# Patient Record
Sex: FEMALE | Race: WHITE | NOT HISPANIC OR LATINO | Employment: FULL TIME | URBAN - METROPOLITAN AREA
[De-identification: names, ages, dates, MRNs, and addresses within clinical notes are randomized per-mention and may not be internally consistent; named-entity substitution may affect disease eponyms.]

---

## 2024-01-01 ENCOUNTER — APPOINTMENT (OUTPATIENT)
Dept: RADIOLOGY | Facility: HOSPITAL | Age: 44
End: 2024-01-01
Payer: COMMERCIAL

## 2024-01-01 ENCOUNTER — HOSPITAL ENCOUNTER (EMERGENCY)
Facility: HOSPITAL | Age: 44
Discharge: HOME | End: 2024-01-01
Attending: EMERGENCY MEDICINE
Payer: COMMERCIAL

## 2024-01-01 VITALS
OXYGEN SATURATION: 98 % | RESPIRATION RATE: 18 BRPM | HEART RATE: 69 BPM | SYSTOLIC BLOOD PRESSURE: 144 MMHG | TEMPERATURE: 98.5 F | DIASTOLIC BLOOD PRESSURE: 92 MMHG

## 2024-01-01 DIAGNOSIS — S93.402A SPRAIN OF LEFT ANKLE, UNSPECIFIED LIGAMENT, INITIAL ENCOUNTER: ICD-10-CM

## 2024-01-01 DIAGNOSIS — S93.602A FOOT SPRAIN, LEFT, INITIAL ENCOUNTER: Primary | ICD-10-CM

## 2024-01-01 PROCEDURE — 99283 EMERGENCY DEPT VISIT LOW MDM: CPT

## 2024-01-01 PROCEDURE — 73630 X-RAY EXAM OF FOOT: CPT

## 2024-01-01 PROCEDURE — 99284 EMERGENCY DEPT VISIT MOD MDM: CPT | Performed by: EMERGENCY MEDICINE

## 2024-01-01 PROCEDURE — 73610 X-RAY EXAM OF ANKLE: CPT

## 2024-01-01 RX ORDER — IBUPROFEN 400 MG/1
400 TABLET ORAL ONCE
Status: COMPLETED | OUTPATIENT
Start: 2024-01-01 | End: 2024-01-01

## 2024-01-01 RX ORDER — ACETAMINOPHEN 325 MG/1
650 TABLET ORAL ONCE
Status: COMPLETED | OUTPATIENT
Start: 2024-01-01 | End: 2024-01-01

## 2024-01-01 RX ADMIN — ACETAMINOPHEN 650 MG: 325 TABLET, FILM COATED ORAL at 20:17

## 2024-01-01 RX ADMIN — IBUPROFEN 400 MG: 400 TABLET, FILM COATED ORAL at 20:17

## 2024-01-01 NOTE — Clinical Note
Michael Marshall was seen and treated in our emergency department on 1/1/2024.                Diagnosis: Acute sprain left ankle and left foot    Michael  may return to work on return date.    She may return on this date: 01/08/2024         If you have any questions or concerns, please don't hesitate to call.      Matthew Soni, DO    ______________________________           _______________          _______________  Hospital Representative                              Date                                Time

## 2024-01-02 NOTE — DISCHARGE INSTRUCTIONS
Ice and elevate foot as often as possible.  Take Tylenol and/or ibuprofen as needed for pain.    Apply Ace wrap for compression when you will be ambulating.  Use crutches to avoid weightbearing until pain subsides.    If pain is not much better in 5 to 7 days schedule orthopedic appointment.  There may be a hairline fracture not visible on the initial x-ray images.  Protect your foot and ankle in the meantime as explained above.

## 2024-01-02 NOTE — ED PROVIDER NOTES
History  Chief Complaint   Patient presents with    Ankle Pain     Patient presents to the ED with c/o left foot pain, states she fell down the last two steps and twisted ankle. Denies head strike      43-year-old female missed the last 2 steps coming down stairs at her [employer] earlier this morning carrying laundry.  She is not sure whether she inverted or everted the foot but complains of pain in the lateral aspect of her left foot and ankle.  She scraped her left hand as well.  No other injuries.  Has been ambulating with discomfort today.        None       History reviewed. No pertinent past medical history.    History reviewed. No pertinent surgical history.    History reviewed. No pertinent family history.  I have reviewed and agree with the history as documented.    E-Cigarette/Vaping     E-Cigarette/Vaping Substances     Social History     Tobacco Use    Smoking status: Never    Smokeless tobacco: Never   Substance Use Topics    Alcohol use: Not Currently    Drug use: Not Currently       Review of Systems   Constitutional:  Negative for fever.   Neurological:  Negative for weakness and numbness.       Physical Exam  Physical Exam  Vitals and nursing note reviewed.   Constitutional:       General: She is not in acute distress.     Appearance: She is well-developed and normal weight. She is not ill-appearing.   HENT:      Head: Normocephalic and atraumatic.      Right Ear: External ear normal.      Left Ear: External ear normal.   Eyes:      Conjunctiva/sclera: Conjunctivae normal.      Pupils: Pupils are equal, round, and reactive to light.   Cardiovascular:      Rate and Rhythm: Normal rate and regular rhythm.      Pulses: Normal pulses.      Heart sounds: Normal heart sounds. No murmur heard.  Pulmonary:      Effort: Pulmonary effort is normal.      Breath sounds: Normal breath sounds.   Chest:      Chest wall: No tenderness.   Abdominal:      General: Bowel sounds are normal.      Palpations: Abdomen  is soft.      Tenderness: There is no abdominal tenderness.   Musculoskeletal:         General: Swelling, tenderness and signs of injury present. No deformity. Normal range of motion.      Cervical back: Normal range of motion and neck supple.      Comments: Left hip, thigh, knee atraumatic. Full ROM. Left ankle and , edematous laterally. This is mild.  Ankle ligaments stable. Distal neurovascular intact.  Full tendon function. No skin wound of lower extremity.    Skin:     General: Skin is warm and dry.      Capillary Refill: Capillary refill takes less than 2 seconds.      Findings: No bruising or rash.   Neurological:      General: No focal deficit present.      Mental Status: She is alert and oriented to person, place, and time. Mental status is at baseline.      Cranial Nerves: No cranial nerve deficit.      Coordination: Coordination normal.      Deep Tendon Reflexes: Reflexes are normal and symmetric.   Psychiatric:         Mood and Affect: Mood normal.         Behavior: Behavior normal.         Vital Signs  ED Triage Vitals   Temperature Pulse Respirations Blood Pressure SpO2   01/01/24 1816 01/01/24 1814 01/01/24 1814 01/01/24 1814 01/01/24 1814   98.5 °F (36.9 °C) 69 18 144/92 98 %      Temp Source Heart Rate Source Patient Position - Orthostatic VS BP Location FiO2 (%)   01/01/24 1816 01/01/24 1814 01/01/24 1814 01/01/24 1814 --   Temporal Monitor Sitting Left arm       Pain Score       01/01/24 1814       9           Vitals:    01/01/24 1814   BP: 144/92   Pulse: 69   Patient Position - Orthostatic VS: Sitting         Visual Acuity      ED Medications  Medications   ibuprofen (MOTRIN) tablet 400 mg (400 mg Oral Given 1/1/24 2017)   acetaminophen (TYLENOL) tablet 650 mg (650 mg Oral Given 1/1/24 2017)       Diagnostic Studies  Results Reviewed       None                   XR foot 3+ views LEFT   ED Interpretation by Matthew Soni DO (01/01 2007)   No acute fracture or dislocation.  Possible  bone cyst distal fibula      Final Result by Pedro Casey MD (01/02 0952)      No acute osseous abnormality.            Workstation performed: [id number]         XR ankle 3+ views LEFT   ED Interpretation by Matthew Soni DO (01/01 2007)   No acute fracture or dislocation      Final Result by Pedro Casey MD (01/02 0952)      No acute osseous abnormality.            Workstation performed: [id number]                    Procedures  Procedures         ED Course                               SBIRT 20yo+      Flowsheet Row Most Recent Value   Initial Alcohol Screen: US AUDIT-C     1. How often do you have a drink containing alcohol? 0 Filed at: 01/01/2024 1816   2. How many drinks containing alcohol do you have on a typical day you are drinking?  0 Filed at: 01/01/2024 1816   3a. Male UNDER 65: How often do you have five or more drinks on one occasion? 0 Filed at: 01/01/2024 1816   3b. FEMALE Any Age, or MALE 65+: How often do you have 4 or more drinks on one occassion? 0 Filed at: 01/01/2024 1816   Audit-C Score 0 Filed at: 01/01/2024 1816   BIRD: How many times in the past year have you...    Used an illegal drug or used a prescription medication for non-medical reasons? Never Filed at: 01/01/2024 1816                      Medical Decision Making  Acute injuury to left ankle and foot.   Low energy impact.  Patient has been ambulating with antalgic gait since event.  Concern for foot and/or ankle sprain and/or fracture.  Will review imaging.    Amount and/or Complexity of Data Reviewed  Radiology: ordered and independent interpretation performed.    Risk  OTC drugs.  Prescription drug management.             Disposition  Final diagnoses:   Foot sprain, left, initial encounter   Sprain of left ankle, unspecified ligament, initial encounter     Time reflects when diagnosis was documented in both MDM as applicable and the Disposition within this note       Time User Action Codes Description Comment     1/1/2024  8:08 PM Matthew Soni Add [M25.571] Acute right ankle pain     1/1/2024  8:08 PM Matthew Soni Remove [M25.571] Acute right ankle pain     1/1/2024  8:09 PM Matthew Soni Add [S93.602A] Foot sprain, left, initial encounter     1/1/2024  8:09 PM Matthew Soni Add [S93.402A] Sprain of left ankle, unspecified ligament, initial encounter           ED Disposition       ED Disposition   Discharge    Condition   Stable    Date/Time   Mon Jan 1, 2024 2008    Comment   Michael Marshall discharge to home/self care.                   Follow-up Information       Follow up With Specialties Details Why Contact Info Additional Information    [facility] Schedule an appointment as soon as possible for a visit in 1 week As needed [address] [facility] [address]  [facility] [address]  [phone] [facility] [address] [place], [place], [address]  [phone]            There are no discharge medications for this patient.      No discharge procedures on file.    PDMP Review       None            ED Provider  Electronically Signed by             Matthew Soni DO  01/02/24 9481

## 2024-01-09 ENCOUNTER — OFFICE VISIT (OUTPATIENT)
Dept: OBGYN CLINIC | Facility: CLINIC | Age: 44
End: 2024-01-09
Payer: COMMERCIAL

## 2024-01-09 VITALS
DIASTOLIC BLOOD PRESSURE: 85 MMHG | BODY MASS INDEX: 22.73 KG/M2 | SYSTOLIC BLOOD PRESSURE: 134 MMHG | WEIGHT: 150 LBS | HEIGHT: 68 IN | HEART RATE: 65 BPM
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
## 2024-01-09 DIAGNOSIS — M25.572 ACUTE LEFT ANKLE PAIN: Primary | ICD-10-CM

## 2024-01-09 DIAGNOSIS — R29.898 ANKLE WEAKNESS: ICD-10-CM

## 2024-01-09 DIAGNOSIS — M25.672 ANKLE STIFFNESS, LEFT: ICD-10-CM

## 2024-01-09 DIAGNOSIS — S93.492A SPRAIN OF ANTERIOR TALOFIBULAR LIGAMENT OF LEFT ANKLE, INITIAL ENCOUNTER: ICD-10-CM

## 2024-01-09 PROCEDURE — 99203 OFFICE O/P NEW LOW 30 MIN: CPT | Performed by: PHYSICIAN ASSISTANT

## 2024-01-09 NOTE — PATIENT INSTRUCTIONS
Ankle Sprain   WHAT YOU NEED TO KNOW:   An ankle sprain happens when 1 or more ligaments in your ankle joint stretch or tear. Ligaments are tough tissues that connect bones. Ligaments support your joints and keep your bones in place.  DISCHARGE INSTRUCTIONS:   Return to the emergency department if:   You have severe pain in your ankle.    Your foot or toes are cold or numb.    Your ankle becomes more weak or unstable (wobbly).    You are unable to put any weight on your ankle or foot.    Your swelling has increased or returned.    Call your doctor if:   Your pain does not go away, even after treatment.    You have questions or concerns about your condition or care.    Medicines:  You may need any of the following:  NSAIDs , such as ibuprofen, help decrease swelling, pain, and fever. This medicine is available with or without a doctor's order. NSAIDs can cause stomach bleeding or kidney problems in certain people. If you take blood thinner medicine, always ask your healthcare provider if NSAIDs are safe for you. Always read the medicine label and follow directions.    Acetaminophen  decreases pain and fever. It is available without a doctor's order. Ask how much to take and how often to take it. Follow directions. Read the labels of all other medicines you are using to see if they also contain acetaminophen, or ask your doctor or pharmacist. Acetaminophen can cause liver damage if not taken correctly.    Prescription pain medicine  may be given. Ask your healthcare provider how to take this medicine safely. Some prescription pain medicines contain acetaminophen. Do not take other medicines that contain acetaminophen without talking to your healthcare provider. Too much acetaminophen may cause liver damage. Prescription pain medicine may cause constipation. Ask your healthcare provider how to prevent or treat constipation.     Take your medicine as directed.  Contact your healthcare provider if you think your medicine  is not helping or if you have side effects. Tell your provider if you are allergic to any medicine. Keep a list of the medicines, vitamins, and herbs you take. Include the amounts, and when and why you take them. Bring the list or the pill bottles to follow-up visits. Carry your medicine list with you in case of an emergency.    Self-care:   Use support devices , such as a brace, cast, or splint, to limit your movement and protect your joint. You may need to use crutches to decrease your pain as you move around.    Go to physical therapy  as directed. A physical therapist teaches you exercises to help improve movement and strength, and to decrease pain.    Rest  your ankle so that it can heal. Return to normal activities as directed.    Apply ice  on your ankle for 15 to 20 minutes every hour or as directed. Use an ice pack, or put crushed ice in a plastic bag. Cover the ice pack or bag with a towel before you put it on your injury. Ice helps prevent tissue damage and decreases swelling and pain.    Compress  your ankle. Ask if you should wrap an elastic bandage around your injured ligament. An elastic bandage provides support and helps decrease swelling and movement so your joint can heal. Wear as long as directed.         Elevate  your ankle above the level of your heart as often as you can. This will help decrease swelling and pain. Prop your ankle on pillows or blankets to keep it elevated comfortably.       Prevent another ankle sprain:   Let your ankle heal.  Find out how long your ligament needs to heal. Do not do any physical activity until your healthcare provider says it is okay. If you start activity too soon, you may develop a more serious injury.    Warm up and stretch before you exercise or play sports.  This helps your joints become strong and flexible.    Use the right equipment.  Always wear shoes that fit well and are made for the activity that you are doing. You may also need ankle supports, elbow  and knee pads, or braces.    Follow up with your doctor as directed:  Write down your questions so you remember to ask them during your visits.  © Copyright [employer] 2023 Information is for End User's use only and may not be sold, redistributed or otherwise used for commercial purposes.  The above information is an  only. It is not intended as medical advice for individual conditions or treatments. Talk to your doctor, nurse or pharmacist before following any medical regimen to see if it is safe and effective for you.

## 2024-01-09 NOTE — PROGRESS NOTES
Orthopaedic Surgery - Office Note  Michael Marshall (43 y.o. female)   : 1980   MRN: [id number]  Encounter Date: 2024    Chief Complaint   Patient presents with    Left Ankle - Pain         Assessment/Plan  Diagnoses and all orders for this visit:    Acute left ankle pain  -     Durable Medical Equipment  -     Ambulatory Referral to Physical Therapy; Future    Sprain of anterior talofibular ligament of left ankle, initial encounter  -     Durable Medical Equipment  -     Ambulatory Referral to Physical Therapy; Future    Ankle stiffness, left  -     Durable Medical Equipment  -     Ambulatory Referral to Physical Therapy; Future    Ankle weakness  -     Durable Medical Equipment  -     Ambulatory Referral to Physical Therapy; Future    Fall from steps, subsequent encounter    The diagnosis as well as treatment options were reviewed with the patient in the office today.  This should not require surgical intervention but will benefit from formal physical therapy.  Patient should ice the ankle 20 minutes on 1 hour off 3 times a day.  Patient may use oral Aleve 1 tablet twice daily with food stopping and calling if any stomach upset occurs.  Patient should utilize crutches to avoid limping but may weight-bear as tolerated.  Crutches may be discontinued when ambulating without a limp.  I would recommend a lace up ankle support until she has regained full range of motion and strength.  While utilizing crutches patient should be on aspirin 81 mg twice daily for DVT prophylaxis.  She should also utilize the aspirin 81 mg twice daily while flying for DVT prophylaxis.  All question concerns were answered in the office today.  Patient will be provided light duty restrictions for work, if unable to accommodate light duty she would be out of work.    Return for Recheck in 2/3 weeks with myself.        History of Present Illness  This is a new patient who injured the left ankle on 2024.  Patient missed the  "last 2 steps while carrying laundry at her [place].  Patient had pain and discomfort and went to the emergency department where x-rays were taken and negative for fracture.  Patient was able to ambulate with a limp after the injury.  She presents today with continued primarily lateral ankle pain and mild medial ankle pain.  No calf pain is reported.  No paresthesias are reported she has not had problems with the ankle in the past.  She has been able to perform full full duties at work.  She reports she [place] [facility] [place] [facility] [place].  She has been icing the ankle, using an Ace wrap, and using over-the-counter anti-inflammatories.    Review of Systems  Pertinent items are noted in HPI.  All other systems were reviewed and are negative.    Physical Exam  /85   Pulse 65   Ht 5' 8\" (1.727 m)   Wt 68 kg (150 lb)   BMI 22.81 kg/m²   Cons: Appears well.  No apparent distress.  Psych: Alert. Oriented x3.  Mood and affect normal.  Patient's left ankle is with tenderness to palpation at the ATFL and to a lesser degree deltoid ligament.  There is no significant soft tissue edema.  Ecchymosis is resolving.  She has no instability to anterior drawer.  She has well-maintained dorsiflexion and plantarflexion.  She has limited inversion and eversion with associated weakness to 4 out of 5.  Plantar flexion and dorsiflexion strength is at 4+ out of 5.  She is nontender at the fifth metatarsal.  She is nontender throughout the midfoot.  She has no tenderness at the calcaneus, Achilles, and high ankle.  She is nontender at the fibular head.  There is no calf tenderness and a negative Homans.  Dorsalis pedis and posterior tibial pulses are +2.               Studies Reviewed  LEFT FOOT/LEFT ANKLE     INDICATION:   injury.     COMPARISON:  None     VIEWS:  XR FOOT 3+ VW LEFT, XR ANKLE 3+ VW LEFT        FINDINGS:     There is no acute fracture or dislocation.     No significant degenerative changes.     No lytic or " blastic osseous lesion.     Soft tissue swelling.     IMPRESSION:     No acute osseous abnormality.           Workstation performed: [id number]  Narrative & Impression   LEFT FOOT/LEFT ANKLE     INDICATION:   injury.     COMPARISON:  None     VIEWS:  XR FOOT 3+ VW LEFT, XR ANKLE 3+ VW LEFT        FINDINGS:     There is no acute fracture or dislocation.     No significant degenerative changes.     No lytic or blastic osseous lesion.     Soft tissue swelling.     IMPRESSION:     No acute osseous abnormality.           Workstation performed: [id number]   X-ray images as well as reports were reviewed by myself in the office today.  Emergency department note was reviewed by myself in the office today.    Procedures  No procedures today.    Medical, Surgical, Family, and Social History  The patient's medical history, family history, and social history, were reviewed and updated as appropriate.    History reviewed. No pertinent past medical history.    History reviewed. No pertinent surgical history.    History reviewed. No pertinent family history.    Social History     Occupational History    Not on file   Tobacco Use    Smoking status: Never    Smokeless tobacco: Never   Substance and Sexual Activity    Alcohol use: Not Currently    Drug use: Not Currently    Sexual activity: Not on file       No Known Allergies    No current outpatient medications on file.      Efra Gaspar PA-C

## 2024-01-09 NOTE — LETTER
January 9, 2024     Patient: Michael Marshall  YOB: 1980  Date of Visit: 1/9/2024      To Whom it May Concern:    Michael Marshall is under my professional care. Michael was seen in my office on 1/9/2024. Michael is on light duty restrictions: Primarily sedentary duty only, lace up ankle brace at work, crutches as needed to avoid limping.  If light duty is unavailable, patient would be out of work until the next evaluation in 2 to 3 weeks.    If you have any questions or concerns, please don't hesitate to call.         Sincerely,          Efra Gaspar PA-C        CC: No Recipients

## 2024-01-11 ENCOUNTER — TELEPHONE (OUTPATIENT)
Age: 44
End: 2024-01-11

## 2024-01-11 NOTE — TELEPHONE ENCOUNTER
Caller: Patient    Doctor: Efra THOMPSON    Reason for call: Patient calling to confirm we've received her FMLA paperwork   Please advise     Call back#: [phone]

## 2024-01-11 NOTE — TELEPHONE ENCOUNTER
Caller: Joanna at [facility]     Doctor: Efra AYALA     Reason for call: Joanna is calling in from [facility] to obtain the office fax number as the patient is trying to apply for FMLA due to her injury.

## 2024-01-12 NOTE — TELEPHONE ENCOUNTER
Caller: Patient     Doctor: Chasity    Reason for call: Patient wanted to confirmed we received her paperwork, I did tell patient we recived it. Patient asked if there we need her signature today or can this wait?  Patient is going to be on vacation next week     Call back#: [phone]

## 2024-01-19 NOTE — TELEPHONE ENCOUNTER
Patient work note states: If light duty is unavailable, patient would be out of work until the next evaluation in 2 to 3 weeks.   I am not writing that the patient is not able to work if the provider states differently   If her employer does not have light duty available then the employer is taking her out of work not the provider    I will change the forms stating that the patient is seeing PT.

## 2024-01-19 NOTE — TELEPHONE ENCOUNTER
Caller: patient     Doctor: Efra Gaspar    Reason for call: FMLA FORM CORRECTION REQUEST    #1 Patient IS seeing PT    #5  Patient IS incapacitated for work purposes    Call back#: [phone]

## 2024-01-23 ENCOUNTER — OFFICE VISIT (OUTPATIENT)
Dept: OBGYN CLINIC | Facility: CLINIC | Age: 44
End: 2024-01-23
Payer: COMMERCIAL

## 2024-01-23 VITALS
SYSTOLIC BLOOD PRESSURE: 133 MMHG | HEIGHT: 68 IN | DIASTOLIC BLOOD PRESSURE: 86 MMHG | HEART RATE: 75 BPM | WEIGHT: 150 LBS | BODY MASS INDEX: 22.73 KG/M2

## 2024-01-23 DIAGNOSIS — M25.572 ACUTE LEFT ANKLE PAIN: ICD-10-CM

## 2024-01-23 DIAGNOSIS — S93.402D MODERATE LEFT ANKLE SPRAIN, SUBSEQUENT ENCOUNTER: ICD-10-CM

## 2024-01-23 DIAGNOSIS — R29.898 ANKLE WEAKNESS: ICD-10-CM

## 2024-01-23 DIAGNOSIS — S93.492D SPRAIN OF ANTERIOR TALOFIBULAR LIGAMENT OF LEFT ANKLE, SUBSEQUENT ENCOUNTER: Primary | ICD-10-CM

## 2024-01-23 PROCEDURE — 99213 OFFICE O/P EST LOW 20 MIN: CPT | Performed by: PHYSICIAN ASSISTANT

## 2024-01-23 NOTE — PATIENT INSTRUCTIONS
P.R.I.C.E. Treatment   WHAT YOU NEED TO KNOW:   What is P.R.I.C.E. treatment?  P.R.I.C.E. treatment is a 5-step process used to decrease swelling and pain caused by an injury. P.R.I.C.E. stands for protect, rest, ice, compress, and elevate. Start P.R.I.C.E. within 24 to 48 hours of an injury.  How do I use P.R.I.C.E. treatment?       Protect  your injury from more damage. Support the injured area with a brace or splint. Your healthcare provider will tell you how long to use the brace or splint.    Rest  your injured area as directed. You may need to stop using, or keep weight off, the injury for 48 hours or longer. Your healthcare provider may recommend crutches or another device. Return to your usual activities as directed.    Apply ice  on your injured area for 15 to 20 minutes every 4 hours or as directed. Use an ice pack, or put crushed ice in a plastic bag. Cover the bag with a towel before you apply it to your skin. Ice helps prevent tissue damage and decreases swelling and pain.    Compress  (keep pressure on) the injured area. Compression will help decrease swelling and support the injured area. Use an elastic bandage, air stirrup, splint, or sling as directed. If you use an elastic bandage, make sure the bandage is not too tight. You should be able to slip 2 fingers between the bandage and your skin.    Elevate  the injured area above the level of your heart as often as you can. This will help decrease swelling and pain. Prop the injured area on pillows or blankets to keep it elevated comfortably.  When should I seek immediate care?   Your pain is severe.    You have severe swelling or deformity.    You have numbness in the injured area.    When should I call my doctor?   Your pain and swelling do not go away after a few days.    You have questions or concerns about your condition or care.    CARE AGREEMENT:   You have the right to help plan your care. Learn about your health condition and how it may be  treated. Discuss treatment options with your healthcare providers to decide what care you want to receive. You always have the right to refuse treatment. The above information is an  only. It is not intended as medical advice for individual conditions or treatments. Talk to your doctor, nurse or pharmacist before following any medical regimen to see if it is safe and effective for you.  © Copyright [employer] 2023 Information is for End User's use only and may not be sold, redistributed or otherwise used for commercial purposes.

## 2024-01-23 NOTE — LETTER
January 23, 2024     Patient: Michael Marshall  YOB: 1980  Date of Visit: 1/23/2024      To Whom it May Concern:    Michael Marshall is under my professional care. Michael was seen in my office on 1/23/2024. Michael will remain on light duty with primarily sedentary duty only.  If light duty is unavailable she would remain out of work until her next evaluation on approximately 1/30/2024.    If you have any questions or concerns, please don't hesitate to call.         Sincerely,          Efra Gaspar PA-C        CC: No Recipients

## 2024-01-23 NOTE — PROGRESS NOTES
Orthopaedic Surgery - Office Note  Michael Marshall (43 y.o. female)   : 1980   MRN: [id number]  Encounter Date: 2024    Chief Complaint   Patient presents with    Left Ankle - Follow-up         Assessment/Plan  Diagnoses and all orders for this visit:    Sprain of anterior talofibular ligament of left ankle, subsequent encounter  -     Ambulatory Referral to Physical Therapy; Future    Acute left ankle pain  -     Ambulatory Referral to Physical Therapy; Future    Moderate left ankle sprain, subsequent encounter  -     Ambulatory Referral to Physical Therapy; Future    Ankle weakness  -     Ambulatory Referral to Physical Therapy; Future    The diagnosis as well as treatment options were reviewed with the patient.  She was advised she has improved her range of motion but still has some weakness.  I would not suspect all symptoms to resolve until she has returned to full range of motion and strength.  She was encouraged to wear the lace up ankle support as needed.  She remain on light duty restrictions and out of work if they cannot accommodate.  I will recheck her in 1 week for repeat evaluation at which time hopefully she is able to return back to full duty.  She will continue to ice the ankle for comfort 20 minutes on 1 hour off 3 times a day.  The importance of PT and home exercise was reviewed.  I advised her to try and get a copy of her PT notes for our records and my review.     Return for Recheck in 1 week.        History of Present Illness  Patient is here for left ankle recheck after injury on 2024.  Patient was seen by myself on 2024 and physical therapy was recommended.  She reports she is currently out on [employer] as her job did not have any of the light duty.  She reports that she has been attending physical therapy at a facility outside of [facility] but does not have any updated progress notes.  She was given a lace up ankle support at the last visit, but is no longer wearing  "it.  She reports medial and lateral ankle pain currently with some associated weakness.  She reports she has been icing to keep the swelling down.  No new injuries are reported.  No calf pain or paresthesias are reported.  She does not believe she is able to return back to her full duty yet and states she is currently listed is out until 1/30/2024.    Review of Systems  Pertinent items are noted in HPI.  All other systems were reviewed and are negative.    Physical Exam  /86   Pulse 75   Ht 5' 8\" (1.727 m)   Wt 68 kg (150 lb)   BMI 22.81 kg/m²   Cons: Appears well.  No apparent distress.  Psych: Alert. Oriented x3.  Mood and affect normal.    Patient's left ankle is without skin breakdown lesion or signs of infection.  She has no significant soft tissue edema or ecchymosis.  She is mildly tender over the ATFL and deltoid ligament.  She has improved her range of motion to dorsiflexion plantarflexion inversion and eversion but still has some weakness to inversion and eversion at 4 out of 5.  She has no instability to anterior drawer.  There is no calf tenderness and a negative Homans.  She is nontender at the posterior tibial tendon or peroneal tendon.  She has intact sensation to light touch in all dermatomes.  Dorsalis pedis and posterior tibial pulses are +2.  Her gait is heel-to-toe without assistive device.               Studies Reviewed  LEFT FOOT/LEFT ANKLE     INDICATION:   injury.     COMPARISON:  None     VIEWS:  XR FOOT 3+ VW LEFT, XR ANKLE 3+ VW LEFT        FINDINGS:     There is no acute fracture or dislocation.     No significant degenerative changes.     No lytic or blastic osseous lesion.     Soft tissue swelling.     IMPRESSION:     No acute osseous abnormality.           Workstation performed: [id number]  Narrative & Impression   LEFT FOOT/LEFT ANKLE     INDICATION:   injury.     COMPARISON:  None     VIEWS:  XR FOOT 3+ VW LEFT, XR ANKLE 3+ VW LEFT        FINDINGS:     There is no acute " fracture or dislocation.     No significant degenerative changes.     No lytic or blastic osseous lesion.     Soft tissue swelling.     IMPRESSION:     No acute osseous abnormality.           Workstation performed: [id number]   X-ray images as well as reports were reviewed by myself in the office today.  Emergency department note was reviewed by myself in the office today.  No PT notes are available for review-I advised the patient to try and get therapy notes for her next visit.    Procedures  No procedures today.    Medical, Surgical, Family, and Social History  The patient's medical history, family history, and social history, were reviewed and updated as appropriate.    History reviewed. No pertinent past medical history.    History reviewed. No pertinent surgical history.    History reviewed. No pertinent family history.    Social History     Occupational History    Not on file   Tobacco Use    Smoking status: Never    Smokeless tobacco: Never   Substance and Sexual Activity    Alcohol use: Not Currently    Drug use: Not Currently    Sexual activity: Not on file       No Known Allergies    No current outpatient medications on file.      Efra Gaspar PA-C

## 2024-01-26 ENCOUNTER — TELEPHONE (OUTPATIENT)
Dept: OBGYN CLINIC | Facility: HOSPITAL | Age: 44
End: 2024-01-26

## 2024-01-26 NOTE — TELEPHONE ENCOUNTER
"Caller: Patient    Doctor: Chasity    Reason for call: Patient needs 2 sections of her paperwork redone on items that were missed.  The dates are missing on the form for start and leave date - they should be 1/9/24 to 1/30/24. Also in the section where it asks about continuous leave it should be checked as \"yes\". She would like these 2 sections corrected and faxed back to Sunil.    Call back#: [phone]  "

## 2024-01-30 ENCOUNTER — OFFICE VISIT (OUTPATIENT)
Dept: OBGYN CLINIC | Facility: CLINIC | Age: 44
End: 2024-01-30
Payer: COMMERCIAL

## 2024-01-30 VITALS
SYSTOLIC BLOOD PRESSURE: 115 MMHG | BODY MASS INDEX: 23.34 KG/M2 | DIASTOLIC BLOOD PRESSURE: 77 MMHG | HEIGHT: 68 IN | HEART RATE: 82 BPM | WEIGHT: 154 LBS
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
## 2024-01-30 DIAGNOSIS — M25.572 ACUTE LEFT ANKLE PAIN: ICD-10-CM

## 2024-01-30 DIAGNOSIS — S93.402D MODERATE LEFT ANKLE SPRAIN, SUBSEQUENT ENCOUNTER: Primary | ICD-10-CM

## 2024-01-30 PROCEDURE — 99213 OFFICE O/P EST LOW 20 MIN: CPT | Performed by: PHYSICIAN ASSISTANT

## 2024-01-30 RX ORDER — IBUPROFEN 200 MG
200 TABLET ORAL
COMMUNITY

## 2024-01-30 NOTE — PROGRESS NOTES
Orthopaedic Surgery - Office Note  Michael Marshall (43 y.o. female)   : 1980   MRN: [id number]  Encounter Date: 2024    Chief Complaint   Patient presents with    Left Ankle - Follow-up         Assessment/Plan  Diagnoses and all orders for this visit:    Moderate left ankle sprain, subsequent encounter  -     MRI ankle/heel left  wo contrast; Future  -     Ambulatory Referral to Orthopedic Surgery; Future  -     Ambulatory Referral to Physical Therapy; Future    Acute left ankle pain    Fall from steps, subsequent encounter    Other orders  -     ibuprofen (MOTRIN) 200 mg tablet; Take 200 mg by mouth    The diagnosis as well as treatment options were reviewed with the patient in the office today.  I advised the patient I am unsure why she is still having so much discomfort in the ankle.  At this point I would recommend an MRI to rule out tendon or ligament disruption.  She should continue physical therapy to optimize her range of motion and strength.  She may ice the foot and ankle for comfort 20 minutes on 1 hour off 3 times a day.  As she does not feel she is able to safely return to her full duty she will continue light duty restrictions-out of work if they cannot be accommodated.  She may use an oral anti-inflammatory such as ibuprofen or Aleve with food for pain and inflammation.       Return for Recheck with foot/ankle specialist to discuss MRI of left ankle.        History of Present Illness  This is a previous patient here for recheck of a left ankle sprain that occurred on 2024.  No new injuries are reported.  She is attending physical therapy at an outside facility.  (Have not received any notes today).  She has been on light duty restrictions which her employer has been unable to accommodate and therefore has been having [facility] paperwork completed to be out of work.  She reports she has been attending physical therapy but does not have any records with her.  She reports her therapist  "advised her to bring to her attention continued medial ankle pain with certain ranges of motion.  She denies any new injuries.  She reports continued significant medial ankle pain.  She reports she does not feel she can do her job safely.  She is here today wearing sneakers.    Review of Systems  Pertinent items are noted in HPI.  All other systems were reviewed and are negative.    Physical Exam  /77 (BP Location: Left arm, Patient Position: Sitting, Cuff Size: Standard)   Pulse 82   Ht 5' 8\" (1.727 m)   Wt 69.9 kg (154 lb)   BMI 23.42 kg/m²   Cons: Appears well.  No apparent distress.  Psych: Alert. Oriented x3.  Mood and affect normal.    Patient's left ankle has no skin breakdown lesion or signs of infection.  There is no soft tissue edema ecchymosis or signs of trauma.  She has well-maintained ankle dorsiflexion, plantarflexion, inversion and eversion today in the office.  She is nontender to palpation along the posterior tibial tendon.  She has mild tenderness at the medial malleolus.  She is no longer tender over the ATFL or midfoot.  Dorsiflexion and plantarflexion strength is 5 out of 5.  Inversion and eversion strength is 4+ out of 5.  She has no calf tenderness and no tenderness at the Achilles.  Calcaneus is nontender.  Dorsalis pedis and posterior tibial pulses are +2.  She has pain with anterior drawer but no instability.               Studies Reviewed  X-ray images as well as reports were reviewed by myself in the office today.  Chart review did not show any physical therapy notes available for review.  Patient was again advised to please have physical therapist provide our office with a copy of notes.  Previous notes were reviewed by myself in the office today    Procedures  No procedures today.    Medical, Surgical, Family, and Social History  The patient's medical history, family history, and social history, were reviewed and updated as appropriate.    History reviewed. No pertinent past " medical history.    History reviewed. No pertinent surgical history.    History reviewed. No pertinent family history.    Social History     Occupational History    Not on file   Tobacco Use    Smoking status: Never    Smokeless tobacco: Never   Substance and Sexual Activity    Alcohol use: Not Currently    Drug use: Not Currently    Sexual activity: Not on file       No Known Allergies      Current Outpatient Medications:     ibuprofen (MOTRIN) 200 mg tablet, Take 200 mg by mouth, Disp: , Rfl:       Efra Gaspar PA-C

## 2024-01-30 NOTE — LETTER
January 30, 2024     Patient: Michael Marshall  YOB: 1980  Date of Visit: 1/30/2024      To Whom it May Concern:    Michael Marshall is under my professional care. Michael was seen in my office on 1/30/2024. Michael should be on light duty with primarily sedentary duty only.  If light duty is unavailable she would be out of work until her next visit with foot and ankle specialist to discuss MRI.    If you have any questions or concerns, please don't hesitate to call.         Sincerely,          Efra Gaspar PA-C        CC: No Recipients

## 2024-01-31 ENCOUNTER — TELEPHONE (OUTPATIENT)
Age: 44
End: 2024-01-31

## 2024-01-31 NOTE — TELEPHONE ENCOUNTER
Caller: Patient    Doctor: Efra Gaspar    Reason for call: Patient stated when she was in the office yesterday she had a return to work form completed and [facility] will not accept it due to not having the dates of her upcoming appts 2/7 for her MRI and 2/9 with Dr Lachman. Also, she stated on the form it doesn't list restrictions, just sedentary duty which her job doesn't allow do to her being a  employee. She would like it updated and refaxed to [facility].    Call back#: [phone]

## 2024-02-09 ENCOUNTER — OFFICE VISIT (OUTPATIENT)
Dept: OBGYN CLINIC | Facility: CLINIC | Age: 44
End: 2024-02-09
Payer: COMMERCIAL

## 2024-02-09 VITALS — BODY MASS INDEX: 23.19 KG/M2 | HEIGHT: 68 IN | WEIGHT: 153 LBS

## 2024-02-09 DIAGNOSIS — M25.672 ANKLE STIFFNESS, LEFT: ICD-10-CM

## 2024-02-09 DIAGNOSIS — R29.898 ANKLE WEAKNESS: ICD-10-CM

## 2024-02-09 DIAGNOSIS — S93.492D SPRAIN OF ANTERIOR TALOFIBULAR LIGAMENT OF LEFT ANKLE, SUBSEQUENT ENCOUNTER: Primary | ICD-10-CM

## 2024-02-09 PROCEDURE — 99213 OFFICE O/P EST LOW 20 MIN: CPT | Performed by: ORTHOPAEDIC SURGERY

## 2024-02-09 NOTE — PROGRESS NOTES
James R Lachman, M.D.  Attending, Orthopaedic Surgery  Foot and Ankle  [facility]      ORTHOPAEDIC FOOT AND ANKLE CLINIC VISIT     Assessment:     Encounter Diagnoses   Name Primary?    Ankle weakness     Sprain of anterior talofibular ligament of left ankle, subsequent encounter Yes    Ankle stiffness, left             Plan:   The patient verbalized understanding of exam findings and treatment plan. We engaged in the shared decision-making process and treatment options were discussed at length with the patient. Surgical and conservative management discussed today along with risks and benefits.  Patient has a left ATFL ankle sprain sustained on 1/1/24. Positive anterior drawer bilaterally   Ankle is significantly weak in all planes on physical exam  Continue physical therapy   Compression sock for swelling control prn  OTC pain meds, ice and elevation for pain control prn   MRI is scheduled. She will RTC when she obtains this. This will be completed from an outside facility so she will need to bring the disc.   Return for MRI results.       History of Present Illness:   Chief Complaint:   Chief Complaint   Patient presents with    Left Ankle - Pain     Mri schedule for Monday. Still has pain. Doing PT.      Michael Marshall is a 43 y.o. female who is being seen for left ankle pain. Patient had an inversion injury on 1/1/24 and has continued pain to her ankle.  Pain is localized at ATFL with minimal radiating and described as sharp and severe. Patient denies numbness, tingling or radicular pain.  Denies history of neuropathy.  Patient does not smoke, does not have diabetes and does not take blood thinners.  Patient denies family history of anesthesia complications and has not had any complications with anesthesia.     Pain/symptom timing:  Worse during the day when active  Pain/symptom context:  Worse with activites and work  Pain/symptom modifying factors:  Rest makes better,  "activities make worse  Pain/symptom associated signs/symptoms: none    Prior treatment   NSAIDsYes   Injections No   Bracing/Orthotics No    Physical Therapy Yes     Orthopedic Surgical History:   See below    Past Medical, Surgical and Social History:  Past Medical History:  has no past medical history on file.  Problem List: does not have any pertinent problems on file.  Past Surgical History:  has no past surgical history on file.  Family History: family history is not on file.  Social History:  reports that she has never smoked. She has never used smokeless tobacco. She reports that she does not currently use alcohol. She reports that she does not currently use drugs.  Current Medications: has a current medication list which includes the following prescription(s): ibuprofen.  Allergies: has No Known Allergies.     Review of Systems:  General- denies fever/chills  HEENT- denies hearing loss or sore throat  Eyes- denies eye pain or visual disturbances, denies red eyes  Respiratory- denies cough or SOB  Cardio- denies chest pain or palpitations  GI- denies abdominal pain  Endocrine- denies urinary frequency  Urinary- denies pain with urination  Musculoskeletal- Negative except noted above  Skin- denies rashes or wounds  Neurological- denies dizziness or headache  Psychiatric- denies anxiety or difficulty concentrating    Physical Exam:   Ht 5' 8\" (1.727 m)   Wt 69.4 kg (153 lb)   BMI 23.26 kg/m²   General/Constitutional: No apparent distress: well-nourished and well developed.  Eyes: normal ocular motion  Cardio: RRR, Normal S1S2, No m/r/g  Lymphatic: No appreciable lymphadenopathy  Respiratory: Non-labored breathing, CTA b/l no w/c/r  Vascular: No edema, swelling or tenderness, except as noted in detailed exam.  Integumentary: No impressive skin lesions present, except as noted in detailed exam.  Neuro: No ataxia or tremors noted  Psych: Normal mood and affect, oriented to person, place and time. Appropriate " affect.  Musculoskeletal: Normal, except as noted in detailed exam and in HPI.    Examination    Left    Gait Normal   Musculoskeletal Tender to palpation at ATFL    Skin Normal.      Nails Normal    Range of Motion  20 degrees dorsiflexion, 30 degrees plantarflexion  Subtalar motion: normal    Stability Stable    Muscle Strength 3/5 tibialis anterior  3+/5 gastrocnemius-soleus  3/5 posterior tibialis  3/5 peroneal/eversion strength  5/5 EHL  5/5 FHL    Neurologic Normal    Sensation Intact to light touch throughout sural, saphenous, superficial peroneal, deep peroneal and medial/lateral plantar nerve distributions.  [place]-Alie 5.07 filament (10g) testing  deferred.    Cardiovascular Brisk capillary refill < 2 seconds,intact DP and PT pulses    Special Tests Equivocal positive anterior drawer bilaterally      Imaging Studies:   3 views of the left ankle were taken, reviewed and interpreted independently that demonstrate no acute osseous abnormalities. Reviewed by me personally.    Scribe Attestation      I,:  Pedro Mata PA-C am acting as a scribe while in the presence of the attending physician.:       I,:  James R Lachman, MD personally performed the services described in this documentation    as scribed in my presence.:               James R. Lachman, MD  Foot & Ankle Surgery   Department of Orthopaedic Surgery  [facility]      I personally performed the service.    James R. Lachman, MD

## 2024-02-09 NOTE — LETTER
February 9, 2024     Patient: Michael Marshall  YOB: 1980  Date of Visit: 2/9/2024      To Whom it May Concern:    Michael Marshall is under my professional care. Michael was seen in my office on 2/9/2024. Michael should be on light duty with primarily sedentary duty only.  If light duty is unavailable she would be out of work. We will update her work status at her next visit, 2/23. We will review her MRI results at that time. If her results are normal, we would expect her to be able to return to work without restriction on 2/26/24. Thank you.    If you have any questions or concerns, please don't hesitate to call.         Sincerely,          Pedro Mata PA-C

## 2024-02-21 ENCOUNTER — OFFICE VISIT (OUTPATIENT)
Dept: OBGYN CLINIC | Facility: CLINIC | Age: 44
End: 2024-02-21
Payer: COMMERCIAL

## 2024-02-21 VITALS
DIASTOLIC BLOOD PRESSURE: 84 MMHG | HEART RATE: 72 BPM | SYSTOLIC BLOOD PRESSURE: 142 MMHG | WEIGHT: 154 LBS | BODY MASS INDEX: 23.34 KG/M2 | HEIGHT: 68 IN

## 2024-02-21 DIAGNOSIS — S93.492D SPRAIN OF ANTERIOR TALOFIBULAR LIGAMENT OF LEFT ANKLE, SUBSEQUENT ENCOUNTER: Primary | ICD-10-CM

## 2024-02-21 DIAGNOSIS — R29.898 ANKLE WEAKNESS: ICD-10-CM

## 2024-02-21 DIAGNOSIS — M79.672 PAIN OF LEFT MIDFOOT: ICD-10-CM

## 2024-02-21 PROCEDURE — 99213 OFFICE O/P EST LOW 20 MIN: CPT | Performed by: ORTHOPAEDIC SURGERY

## 2024-02-21 NOTE — LETTER
February 21, 2024     Patient: Michael Marshall  YOB: 1980  Date of Visit: 2/21/2024      To Whom it May Concern:    Michael Marshall is under my professional care. Michael was seen in my office on 2/21/2024. Michael is able to return to work without restrictions or limitations on 2/23/24.     If you have any questions or concerns, please don't hesitate to call.         Sincerely,          James R Lachman, MD        CC: No Recipients

## 2024-02-21 NOTE — PROGRESS NOTES
James R Lachman, M.D.  Attending, Orthopaedic Surgery  Foot and Ankle  [facility]      ORTHOPAEDIC FOOT AND ANKLE CLINIC VISIT     Assessment:     Encounter Diagnoses   Name Primary?    Sprain of anterior talofibular ligament of left ankle, subsequent encounter Yes    Sprain of left midfoot     Ankle weakness, left             Plan:   The patient verbalized understanding of exam findings and treatment plan. We engaged in the shared decision-making process and treatment options were discussed at length with the patient. Surgical and conservative management discussed today along with risks and benefits.  Explained to the patient that her MRI shows a sprain. She has a positive Anterior Drawer test bilaterally.   She was instructed to continue with formal physical therapy/HEP   Compression stocking for swelling control as needed  If her symptoms persist and/or she has continued multiple instability episodes about the ankle then we could consider a Augmented Broström procedure for her left ankle. Hx of Jaimee-Danlos Syndrome  Return if symptoms worsen or fail to improve.      History of Present Illness:   Chief Complaint:   Chief Complaint   Patient presents with    Follow-up     Follow up left ankle. Mri review      Michael Marshall is a 43 y.o. female who is being seen in follow-up for left ankle as well as to discuss MRI results. When we last saw she we recommended obtaining MRI and continuing PT/HEP.  Pain has slightly improved. Residual pain is localized at anterolateral ankle with minimal radiating and described as sharp and severe.      Pain/symptom timing:  Worse during the day when active  Pain/symptom context:  Worse with activites and work  Pain/symptom modifying factors:  Rest makes better, activities make worse  Pain/symptom associated signs/symptoms: none    Prior treatment   NSAIDsYes   Injections No   Bracing/Orthotics Yes    Physical Therapy Yes     Orthopedic Surgical  "History:   See below    Past Medical, Surgical and Social History:  Past Medical History:  has no past medical history on file.  Problem List: does not have any pertinent problems on file.  Past Surgical History:  has no past surgical history on file.  Family History: family history is not on file.  Social History:  reports that she has never smoked. She has never used smokeless tobacco. She reports that she does not currently use alcohol. She reports that she does not currently use drugs.  Current Medications: has a current medication list which includes the following prescription(s): ibuprofen.  Allergies: has No Known Allergies.     Review of Systems:  General- denies fever/chills  HEENT- denies hearing loss or sore throat  Eyes- denies eye pain or visual disturbances, denies red eyes  Respiratory- denies cough or SOB  Cardio- denies chest pain or palpitations  GI- denies abdominal pain  Endocrine- denies urinary frequency  Urinary- denies pain with urination  Musculoskeletal- Negative except noted above  Skin- denies rashes or wounds  Neurological- denies dizziness or headache  Psychiatric- denies anxiety or difficulty concentrating    Physical Exam:   /84   Pulse 72   Ht 5' 8\" (1.727 m)   Wt 69.9 kg (154 lb)   BMI 23.42 kg/m²   General/Constitutional: No apparent distress: well-nourished and well developed.  Eyes: normal ocular motion  Lymphatic: No appreciable lymphadenopathy  Respiratory: Non-labored breathing  Vascular: No edema, swelling or tenderness, except as noted in detailed exam.  Integumentary: No impressive skin lesions present, except as noted in detailed exam.  Neuro: No ataxia or tremors noted  Psych: Normal mood and affect, oriented to person, place and time. Appropriate affect.  Musculoskeletal: Normal, except as noted in detailed exam and in HPI.    Examination    Left    Gait Normal   Musculoskeletal Tender to palpation at ATFL    Skin Normal.    Nails Normal    Range of Motion  20 " degrees dorsiflexion, 30 degrees plantarflexion  Subtalar motion: normal    Stability Stable    Muscle Strength 3/5 tibialis anterior  4/5 gastrocnemius-soleus  3/5 posterior tibialis  3/5 peroneal/eversion strength  5/5 EHL  5/5 FHL    Neurologic Normal    Sensation Intact to light touch throughout sural, saphenous, superficial peroneal, deep peroneal and medial/lateral plantar nerve distributions.  [place]-Alie 5.07 filament (10g) testing deferred.    Cardiovascular Brisk capillary refill < 2 seconds,intact DP and PT pulses    Special Tests Equivocal Positive Anterior Drawer Test Bilaterally.      Imaging Studies:   MRI available for review of left ankle which demonstrates sprain of the midfoot as well as evidence of prior ankle sprain. No other ligamentous or tendon abnormalities. Reviewed by me personally.    James R. Lachman, MD  Foot & Ankle Surgery   Department of Orthopaedic Surgery  [facility]      I personally performed the service.    James R. Lachman, MD    Scribe Attestation      I,:  Cristhian Aguilera am acting as a scribe while in the presence of the attending physician.:       I,:  James R Lachman, MD personally performed the services described in this documentation    as scribed in my presence.: